# Patient Record
(demographics unavailable — no encounter records)

---

## 2025-02-10 NOTE — ASSESSMENT
[FreeTextEntry1] : 26 yo obese (BMI 32) Female w/ Hx of anemia attributed to heavy periods, requiring blood transfusion in past (2021, Hb was ~7), lower back pain (requiring ER visits, on Celebrex and Flexeril), possible PCOS, s/p cholecystectomy (11/2023), and renal cysts, was referred by PCP (Dr. Devicka Persaud) for incidentally found liver lesion.   Ordered repeat MRI abd, Fibroscan to assess background liver and hepatitis viral serologies to assess her risk factors, as well as tumor markers and assessing for metabolic risk factors.  RTC w/ above results in 3 weeks

## 2025-02-10 NOTE — HISTORY OF PRESENT ILLNESS
[Body Piercing] : body piercing [Travel to Endemic Area] : travel to an endemic area [Needlestick Exposure] : no needlestick exposure [Infected Sexual Partner] : no infected sexual partner [IV Drug Use] : no IV drug use [Tattoo] : no tattoos [Hemodialysis] : no hemodialysis [Transfusion before 1992] : no transfusion before 1992 [Transplant before 1992] : no transplant before 1992 [Incarceration] : no incarceration [Alcohol Abuse] : no alcohol abuse [Autoimmune Disorder] : no autoimmune disorder [Household Contact to HBV] : no household contact to HBV [Occupational Exposure] : no occupational exposure [Cocaine Use] : no cocaine use [de-identified] : [facility] 2021 b/o Hb 7. Born in [facility], [place] 15. Ear piercing since infancy. Only social alcohol extremely rarely.  [FreeTextEntry1] : 28 yo obese (BMI 32) Female w/ Hx of anemia attributed to heavy periods, requiring blood transfusion in past (2021, Hb was ~7), lower back pain (requiring ER visits, on Celebrex and Flexeril), possible PCOS, s/p cholecystectomy (11/2023), and renal cysts, was referred by PCP (Dr. Devicka Persaud) for incidentally found liver lesion.   She is currently taking medroxyprogesterone since 1/28/2025 for 10 days b/o thick endometrial lining.  She has been taking OCP since age 13 (for irregular heavy period), till 2022 when she stopped. No prior pregnancies.  She denied toxic habits. 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   She had flu recently.

## 2025-02-10 NOTE — PHYSICAL EXAM
[Scleral Icterus] : No Scleral Icterus [Spider Angioma] : No spider angioma(s) were observed [Abdominal  Ascites] : no ascites [Non-Tender] : non-tender [Asterixis] : no asterixis observed [Jaundice] : No jaundice [Palmar Erythema] : no Palmar Erythema [General Appearance - Alert] : alert [General Appearance - In No Acute Distress] : in no acute distress [Sclera] : the sclera and conjunctiva were normal [Oropharynx] : the oropharynx was normal [Neck Appearance] : the appearance of the neck was normal [Neck Cervical Mass (___cm)] : no neck mass was observed [Jugular Venous Distention Increased] : there was no jugular-venous distention [Thyroid Diffuse Enlargement] : the thyroid was not enlarged [Thyroid Nodule] : there were no palpable thyroid nodules [Auscultation Breath Sounds / Voice Sounds] : lungs were clear to auscultation bilaterally [Heart Rate And Rhythm] : heart rate was normal and rhythm regular [Heart Sounds] : normal S1 and S2 [Heart Sounds Gallop] : no gallops [Murmurs] : no murmurs [Heart Sounds Pericardial Friction Rub] : no pericardial rub [Edema] : there was no peripheral edema [Bowel Sounds] : normal bowel sounds [Abdomen Soft] : soft [Abdomen Tenderness] : non-tender [] : no hepato-splenomegaly [Abdomen Mass (___ Cm)] : no abdominal mass palpated [Cervical Lymph Nodes Enlarged Posterior Bilaterally] : posterior cervical [Cervical Lymph Nodes Enlarged Anterior Bilaterally] : anterior cervical [Supraclavicular Lymph Nodes Enlarged Bilaterally] : supraclavicular [Axillary Lymph Nodes Enlarged Bilaterally] : axillary [Femoral Lymph Nodes Enlarged Bilaterally] : femoral [Inguinal Lymph Nodes Enlarged Bilaterally] : inguinal [No CVA Tenderness] : no ~M costovertebral angle tenderness [No Spinal Tenderness] : no spinal tenderness [Abnormal Walk] : normal gait [Skin Color & Pigmentation] : normal skin color and pigmentation [FreeTextEntry1] : Grossly intact [Oriented To Time, Place, And Person] : oriented to person, place, and time [Impaired Insight] : insight and judgment were intact [Affect] : the affect was normal

## 2025-02-10 NOTE — HISTORY OF PRESENT ILLNESS
[Body Piercing] : body piercing [Travel to Endemic Area] : travel to an endemic area [Needlestick Exposure] : no needlestick exposure [Infected Sexual Partner] : no infected sexual partner [IV Drug Use] : no IV drug use [Tattoo] : no tattoos [Hemodialysis] : no hemodialysis [Transfusion before 1992] : no transfusion before 1992 [Transplant before 1992] : no transplant before 1992 [Incarceration] : no incarceration [Alcohol Abuse] : no alcohol abuse [Autoimmune Disorder] : no autoimmune disorder [Household Contact to HBV] : no household contact to HBV [Occupational Exposure] : no occupational exposure [Cocaine Use] : no cocaine use [de-identified] : [facility] 2021 b/o Hb 7. Born in [facility], [place] to [place] age 15. Ear piercing since infancy. Only social alcohol extremely rarely.  [FreeTextEntry1] : 26 yo obese (BMI 32) Female w/ Hx of anemia attributed to heavy periods, requiring blood transfusion in past (2021, Hb was ~7), lower back pain (requiring ER visits, on Celebrex and Flexeril), possible PCOS, s/p cholecystectomy (11/2023), and renal cysts, was referred by PCP (Dr. Devicka Persaud) for incidentally found liver lesion.   She is currently taking medroxyprogesterone since 1/28/2025 for 10 days b/o thick endometrial lining.  She has been taking OCP since age 13 (for irregular heavy period), till 2022 when she stopped. No prior pregnancies.  She denied toxic habits. 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   She had flu recently.

## 2025-02-10 NOTE — REVIEW OF SYSTEMS
[Recent Weight Gain (___ Lbs)] : recent [unfilled] ~Ulb weight gain [SOB on Exertion] : shortness of breath during exertion [As Noted in HPI] : as noted in HPI [Abdominal Pain] : abdominal pain [Heartburn] : heartburn [Arthralgias] : arthralgias [Easy Bruising] : a tendency for easy bruising [Negative] : ENT [Fever] : no fever [Chills] : no chills [Feeling Poorly] : not feeling poorly [Feeling Tired] : not feeling tired [Chest Pain] : no chest pain [Palpitations] : no palpitations [Shortness Of Breath] : no shortness of breath [Cough] : no cough [Vomiting] : no vomiting [Constipation] : no constipation [Diarrhea] : no diarrhea [Melena] : no melena [Dysuria] : no dysuria [Itching] : no itching [Confused] : no confusion [FreeTextEntry6] : 3 blocks, 2 flights of stairs [FreeTextEntry7] : Most of the time periumbilical when she eats or drink cold. No nausea. No hematochezia.  [FreeTextEntry8] : on her period, bleeding since 11/2024 [FreeTextEntry9] : knee b/l chronic